# Patient Record
Sex: FEMALE | Race: WHITE | NOT HISPANIC OR LATINO | Employment: STUDENT | URBAN - METROPOLITAN AREA
[De-identification: names, ages, dates, MRNs, and addresses within clinical notes are randomized per-mention and may not be internally consistent; named-entity substitution may affect disease eponyms.]

---
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
## 2025-01-07 ENCOUNTER — TREATMENT (OUTPATIENT)
Dept: PHYSICAL THERAPY | Facility: CLINIC | Age: 20
End: 2025-01-07
Payer: COMMERCIAL

## 2025-01-07 DIAGNOSIS — S43.431D TEAR OF RIGHT GLENOID LABRUM, SUBSEQUENT ENCOUNTER: ICD-10-CM

## 2025-01-07 DIAGNOSIS — M25.611 DECREASED ROM OF RIGHT SHOULDER: ICD-10-CM

## 2025-01-07 DIAGNOSIS — R29.898 WEAKNESS OF RIGHT SHOULDER: ICD-10-CM

## 2025-01-07 PROCEDURE — 97110 THERAPEUTIC EXERCISES: CPT | Mod: GP

## 2025-01-07 NOTE — PROGRESS NOTES
"Patient Name: Segun Culp  MRN: [id number]  Time Calculation  Start Time: 1445  Stop Time: 1525  Time Calculation (min): 40 min     PT Therapeutic Procedures Time Entry  Therapeutic Exercise Time Entry: 40                     Current Problem  1. Tear of right glenoid labrum, subsequent encounter  Follow Up In Physical Therapy      2. Decreased ROM of right shoulder  Follow Up In Physical Therapy      3. Weakness of right shoulder  Follow Up In Physical Therapy          Insurance    [employer]HEM APPROVED  4  PT   VISITS  12-19-24 THRU 1-17-25  AUTH# [id number]  [id number]/ALL    Subjective     General  Pt reports the shoulder is doing well, no issues with the HEP she was given. Does report that she has tried to do some throwing and I felt okay for the most part. \"It only felt weird for the first few throws, I wasn't throwing very hard far at all\".   Precautions  Labral repair 6/18/24   Pain  0/10    Objective     Treatments:      UBE:  3'/3' LVL 2.5   Wall slides:  15x5s scap/flx  ER iso :  10x10s RTB  ER iso+ FERMIN:  x15 GTB   90 deg ER/IR RTB->GTB :  2x10   Horizontal abd /add:  2x15 GTB   PB ball on the wall trunk rotations:  x15    Body blade:  x10 up/down, pronated M/L, D1 and D2 flexion   PB plank walkouts:  x10,       OP EDUCATION/HEP:  Discussed return to lifting, specifically to start very light and be careful with bicep curls and any pull movements. Also discussed taking caution with return to throwing at this point, and ideally she should focus on improving more shoulder strength and dynamic stability before progressing to that point. Pt reported understanding with all education provided.     Access Code: [email or web address]  URL: [email or web address]  Date: 01/07/2025  Prepared by: Josue Suresh    Exercises  - Shoulder Horizontal Abduction with Anchored Resistance  - 1 x daily - 4 x weekly - 2 sets - 10 reps  - Standing Shoulder Horizontal Adduction with Anchored Resistance  - 1 x daily - 4 x weekly - " 2 sets - 10 reps  - Isometric Standing Shoulder External Rotation in Abduction  - 1 x daily - 4 x weekly - 2 sets - 10 reps  - Standing Single Arm Shoulder Internal Rotation in Abduction with Anchored Resistance  - 1 x daily - 7 x weekly - 2 sets - 10 reps  Assessment   Pt with good tolerance to session today, added a myriad of new activities to promote RTC strength and stability through different planes of motion. Pt still restricted into ER but overall able to complete all activities provided today. At EOS she endorsed expected muscle fatigue. Spent time discussing return to weight lifting/gym activities, specifically advising her to avoid rapid return to previous weights and to instead very gradually progress back to prior level of weights. Updated HEP with new exercises for shoulder strength at higher arm angles. Pt denied any questions at EOS. Recommend continued skilled PT to progress strength and ROM in order to restore surgical UE to PLOF.     Plan     Continue per POC. Progress overhead strength/stability, cont work through various different planes of motion

## 2025-01-08 NOTE — PROGRESS NOTES
"Physical Therapy Treatment    Patient Name: Segun Culp  MRN: [id number]  Today's Date: 1/9/2025  Time Calculation  Start Time: 0918  Stop Time: 1000  Time Calculation (min): 42 min     PT Therapeutic Procedures Time Entry  Therapeutic Exercise Time Entry: 40                 Current Problem  1. Tear of right glenoid labrum, subsequent encounter  Follow Up In Physical Therapy      2. Decreased ROM of right shoulder  Follow Up In Physical Therapy      3. Weakness of right shoulder  Follow Up In Physical Therapy          Insurance:  [facility] APPROVED  4  PT   VISITS  12-19-24 THRU 1-17-25  AUTH# [id number]  [id number]/ALL   Visit# 3/5  Precautions   Labral repair 6/18/24     Subjective   Subjective:   Pt reports that her shoulder is feeling fine. She had some muscle soreness, but not pain after last therapy session.   Pain   0/10    Objective   Treatments:  UBE:  3'/3' LVL 2.5   Wall slides SA flex 2x10  ER iso :  10x10s RTB----  ER iso+ FERMIN:  x20 GTB   90 deg ER/IR RTB->GTB :  2x10   Horizontal abd /add:  2x15 GTB   PB ball on the wall trunk rotations:  x15   Body blade: vertical/horizontal 20\"x ea/ diagonal 5x  PB plank walkouts:  x10,   MB arc taps on wall 2.5# 10x  D1/D2 standing flx RTB 20x  PB rhythmic stab  at B flex 90* 1'x2  Prone on PB W's 2# 20x    OP EDUCATION:   Shoulder strengthening      Assessment:   Pt displayed good range with PNF patterns. Introduced arc taps, with fatigue, but good form. Stability maintained with PB trunk rotations. Pt felt a little pain in R shoulder with lat pulldowns when raising the arm up. Able to maintain Er with jobes flexion. Pt displayed good motion with body blade. Normal fatigue following therapy session.    Plan:   Cont with r shoulder strengthening              "

## 2025-01-09 ENCOUNTER — TREATMENT (OUTPATIENT)
Dept: PHYSICAL THERAPY | Facility: CLINIC | Age: 20
End: 2025-01-09
Payer: COMMERCIAL

## 2025-01-09 DIAGNOSIS — R29.898 WEAKNESS OF RIGHT SHOULDER: ICD-10-CM

## 2025-01-09 DIAGNOSIS — S43.431D TEAR OF RIGHT GLENOID LABRUM, SUBSEQUENT ENCOUNTER: Primary | ICD-10-CM

## 2025-01-09 DIAGNOSIS — M25.611 DECREASED ROM OF RIGHT SHOULDER: ICD-10-CM

## 2025-01-09 PROCEDURE — 97110 THERAPEUTIC EXERCISES: CPT | Mod: GP,CQ

## 2025-01-14 ENCOUNTER — APPOINTMENT (OUTPATIENT)
Dept: PHYSICAL THERAPY | Facility: CLINIC | Age: 20
End: 2025-01-14
Payer: COMMERCIAL

## 2025-01-14 NOTE — PROGRESS NOTES
"Physical Therapy Treatment    Patient Name: Segun Culp  MRN: [id number]  Today's Date: 1/14/2025                          Current Problem  No diagnosis found.      Insurance:  Count includes the [facility] APPROVED  4  PT   VISITS  12-19-24 THRU 1-17-25  AUTH# [id number]  [id number]/ALL   Visit# 3/5  Precautions   Labral repair 6/18/24     Subjective   Subjective:   Pt reports that her shoulder is feeling fine. She had some muscle soreness, but not pain after last therapy session.   Pain   0/10    Objective   Treatments:  UBE:  3'/3' LVL 2.5   Wall slides SA flex 2x10  ER iso :  10x10s RTB----  ER iso+ FERMIN:  x20 GTB   90 deg ER/IR RTB->GTB :  2x10   Horizontal abd /add:  2x15 GTB   PB ball on the wall trunk rotations:  x15   Body blade: vertical/horizontal 20\"x ea/ diagonal 5x  PB plank walkouts:  x10,   MB arc taps on wall 2.5# 10x  D1/D2 standing flx RTB 20x  PB rhythmic stab  at B flex 90* 1'x2  Prone on PB W's 2# 20x    OP EDUCATION:   Shoulder strengthening      Assessment:   Pt displayed good range with PNF patterns. Introduced arc taps, with fatigue, but good form. Stability maintained with PB trunk rotations. Pt felt a little pain in R shoulder with lat pulldowns when raising the arm up. Able to maintain Er with jobes flexion. Pt displayed good motion with body blade. Normal fatigue following therapy session.    Plan:   Cont with r shoulder strengthening              "

## 2025-01-16 ENCOUNTER — APPOINTMENT (OUTPATIENT)
Dept: PHYSICAL THERAPY | Facility: CLINIC | Age: 20
End: 2025-01-16
Payer: COMMERCIAL

## 2025-01-16 NOTE — PROGRESS NOTES
"Physical Therapy Treatment    Patient Name: Segun Culp  MRN: [id number]  Today's Date: 1/16/2025                          Current Problem  No diagnosis found.      Insurance:  [facility] APPROVED  4  PT   VISITS  12-19-24 THRU 1-17-25  AUTH# [id number]  [id number]/ALL   Visit# 3/5  Precautions   Labral repair 6/18/24     Subjective   Subjective:   Pt reports that her shoulder is feeling fine. She had some muscle soreness, but not pain after last therapy session.   Pain   0/10    Objective   Treatments:  UBE:  3'/3' LVL 2.5   Wall slides SA flex 2x10  ER iso :  10x10s RTB----  ER iso+ FERMIN:  x20 GTB   90 deg ER/IR RTB->GTB :  2x10   Horizontal abd /add:  2x15 GTB   PB ball on the wall trunk rotations:  x15   Body blade: vertical/horizontal 20\"x ea/ diagonal 5x  PB plank walkouts:  x10,   MB arc taps on wall 2.5# 10x  D1/D2 standing flx RTB 20x  PB rhythmic stab  at B flex 90* 1'x2  Prone on PB W's 2# 20x    OP EDUCATION:   Shoulder strengthening      Assessment:   Pt displayed good range with PNF patterns. Introduced arc taps, with fatigue, but good form. Stability maintained with PB trunk rotations. Pt felt a little pain in R shoulder with lat pulldowns when raising the arm up. Able to maintain Er with jobes flexion. Pt displayed good motion with body blade. Normal fatigue following therapy session.    Plan:   Cont with r shoulder strengthening              "

## 2025-03-20 ENCOUNTER — APPOINTMENT (OUTPATIENT)
Dept: PRIMARY CARE | Facility: CLINIC | Age: 20
End: 2025-03-20
Payer: COMMERCIAL

## 2025-05-15 ENCOUNTER — TELEPHONE (OUTPATIENT)
Dept: PRIMARY CARE | Facility: CLINIC | Age: 20
End: 2025-05-15
Payer: COMMERCIAL

## 2025-05-15 DIAGNOSIS — Z11.1 SCREENING FOR TUBERCULOSIS: ICD-10-CM

## 2025-05-15 NOTE — TELEPHONE ENCOUNTER
Last PX 12/19/2024. Pt has form for [facility] [employer] school that she will drop off. Pt asking for TB test, tdap, and chicken pox vaccine. Can pt have orders so she can do a nurse visit to update all vaccines?

## 2025-05-19 ENCOUNTER — APPOINTMENT (OUTPATIENT)
Dept: PRIMARY CARE | Facility: CLINIC | Age: 20
End: 2025-05-19
Payer: COMMERCIAL

## 2025-05-19 DIAGNOSIS — Z23 NEED FOR VACCINATION: ICD-10-CM

## 2025-05-19 PROCEDURE — 90715 TDAP VACCINE 7 YRS/> IM: CPT | Performed by: FAMILY MEDICINE

## 2025-05-19 PROCEDURE — 90471 IMMUNIZATION ADMIN: CPT | Performed by: FAMILY MEDICINE

## 2025-05-22 LAB
IGNF NEG CNTRL BLD: NORMAL
M TB IFN-G BLD-IMP: NEGATIVE
MITOGEN IGNF.SPOT COUNT BLD: NORMAL
QUEST PANEL A SPOT COUNT: 0
QUEST PANEL B SPOT COUNT: 0

## 2025-06-03 ENCOUNTER — APPOINTMENT (OUTPATIENT)
Dept: PRIMARY CARE | Facility: CLINIC | Age: 20
End: 2025-06-03
Payer: COMMERCIAL

## 2025-12-22 ENCOUNTER — APPOINTMENT (OUTPATIENT)
Dept: PRIMARY CARE | Facility: CLINIC | Age: 20
End: 2025-12-22
Payer: COMMERCIAL